# Patient Record
Sex: MALE | Race: OTHER | HISPANIC OR LATINO | Employment: UNEMPLOYED | URBAN - METROPOLITAN AREA
[De-identification: names, ages, dates, MRNs, and addresses within clinical notes are randomized per-mention and may not be internally consistent; named-entity substitution may affect disease eponyms.]

---

## 2017-05-12 ENCOUNTER — HOSPITAL ENCOUNTER (EMERGENCY)
Facility: HOSPITAL | Age: 32
Discharge: HOME | End: 2017-05-12
Payer: COMMERCIAL

## 2017-05-12 VITALS
SYSTOLIC BLOOD PRESSURE: 144 MMHG | RESPIRATION RATE: 22 BRPM | OXYGEN SATURATION: 99 % | TEMPERATURE: 97.8 F | DIASTOLIC BLOOD PRESSURE: 80 MMHG | WEIGHT: 190.26 LBS | HEART RATE: 80 BPM
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
## 2017-05-12 DIAGNOSIS — S41.159A DOG BITE OF ARM: Primary | ICD-10-CM

## 2017-05-12 PROCEDURE — 90471 IMMUNIZATION ADMIN: CPT

## 2017-05-12 PROCEDURE — 99283 EMERGENCY DEPT VISIT LOW MDM: CPT

## 2017-05-12 PROCEDURE — 90715 TDAP VACCINE 7 YRS/> IM: CPT | Performed by: PHYSICIAN ASSISTANT

## 2017-05-12 RX ORDER — BACITRACIN, NEOMYCIN, POLYMYXIN B 400; 3.5; 5 [USP'U]/G; MG/G; [USP'U]/G
OINTMENT TOPICAL
Status: DISCONTINUED
Start: 2017-05-12 | End: 2017-05-12 | Stop reason: WASHOUT

## 2017-05-12 RX ORDER — AMOXICILLIN AND CLAVULANATE POTASSIUM 875; 125 MG/1; MG/1
1 TABLET, FILM COATED ORAL 2 TIMES DAILY
Qty: 20 TABLET | Refills: 0 | Status: SHIPPED | OUTPATIENT
Start: 2017-05-12 | End: 2017-05-22

## 2017-05-12 RX ORDER — AMOXICILLIN AND CLAVULANATE POTASSIUM 875; 125 MG/1; MG/1
1 TABLET, FILM COATED ORAL ONCE
Status: COMPLETED | OUTPATIENT
Start: 2017-05-12 | End: 2017-05-12

## 2017-05-12 RX ORDER — IBUPROFEN 600 MG/1
600 TABLET ORAL EVERY 6 HOURS PRN
Qty: 30 TABLET | Refills: 0 | Status: SHIPPED | OUTPATIENT
Start: 2017-05-12 | End: 2020-12-20 | Stop reason: ALTCHOICE

## 2017-05-12 RX ORDER — BACITRACIN ZINC, NEOMYCIN SULFATE, AND POLYMYXIN B SULFATE 400; 3.5; 5 [IU]/G; MG/G; [IU]/G
1 OINTMENT TOPICAL ONCE
Status: DISCONTINUED | OUTPATIENT
Start: 2017-05-12 | End: 2017-05-12 | Stop reason: CLARIF

## 2017-05-12 RX ADMIN — TETANUS TOXOID, REDUCED DIPHTHERIA TOXOID AND ACELLULAR PERTUSSIS VACCINE, ADSORBED 0.5 ML: 5; 2.5; 8; 8; 2.5 SUSPENSION INTRAMUSCULAR at 10:43

## 2017-05-12 RX ADMIN — AMOXICILLIN AND CLAVULANATE POTASSIUM 1 TABLET: 875; 125 TABLET, FILM COATED ORAL at 10:43

## 2017-10-26 ENCOUNTER — GENERIC CONVERSION - ENCOUNTER (OUTPATIENT)
Dept: OTHER | Facility: OTHER | Age: 32
End: 2017-10-26

## 2018-01-09 NOTE — MISCELLANEOUS
Provider Comments  Provider Comments:   Patient was a no show to today's appointment at 1300        Signatures   Electronically signed by : JESSICA Brooks; Mar  2 2016  9:12PM EST                       (Author)    Electronically signed by : TATIANA Salmon ; Mar  3 2016 11:05AM EST

## 2018-01-11 NOTE — MISCELLANEOUS
Provider Comments  Provider Comments:   pt didn't show to his appointment        Signatures   Electronically signed by : ERICK Mabry; Jun 9 2016  4:42PM EST                       (Author)    Electronically signed by : TATIANA Cruz ; Jun 9 2016  5:27PM EST

## 2018-01-11 NOTE — MISCELLANEOUS
Dear [facility]:       This letter is to inform you that you are being dismissed from our practice due to excessive No-shows for scheduled medical visits  A no-show is defined as any patient who fails to arrive for a scheduled appointment without canceling the appointment  prior to the scheduled time  A patient who has more than three no-shows may be dismissed from a [facility] practice  You have had 3 no-shows in the past   months  In the event of a suspected serious emergency medical condition, please go to the closest hospital Emergency Department  There are many competent physicians in the surrounding area  Sources you may want to use in finding a new physician  may include contacting your insurance company, calling a local hospital, or reviewing the Yellow Pages of your local telephone book  Please have your new physicians office send us a medical records release form to transfer your medical  record  Respectfully,    ________________________________     Practice Administrator                    LETRA DE NOTIFICACIÓN DE NO SHOW  (Para imprimir en papel con membrete práctico)              Estimado  ABIMELEC:    Esta carta es para informarle que está siendo despedido de [facility] práctica debido a exceso de no-muestra o no-shows para las visitas [employer]  Un no-show se  define elizabeth cualquier paciente que no llega a yobany lv programada sin cancelar la lv antes de la hora programada  Un paciente que tiene más de vanesa, (3) no-shows puede ser despedido de la práctica [facility]  Usted  ha tenido 3 no-shows en el pasado meses  En el polly de yobany sospecha de yobany condición médica de emergencia grave, acuda al servicio de urgencias del hospital más cercano  Hay muchos médicos competentes en los alrededores   [place] que puede utilizar para encontrar un nuevo médico pueden incluir contactar a gar compañía de Natasha Lacey a un hospital local o revisar las [place] rin [place] telefónica  local  Damaris que la oficina de gar nuevo médico le envíe yobany autorización de registro médico para transferir gar expediente médico     Respetuosamente,       _____________________                                 Kurt Don

## 2020-12-20 ENCOUNTER — HOSPITAL ENCOUNTER (EMERGENCY)
Facility: HOSPITAL | Age: 35
Discharge: HOME | End: 2020-12-20
Attending: EMERGENCY MEDICINE | Admitting: EMERGENCY MEDICINE
Payer: COMMERCIAL

## 2020-12-20 VITALS
RESPIRATION RATE: 18 BRPM | SYSTOLIC BLOOD PRESSURE: 166 MMHG | TEMPERATURE: 98.5 F | DIASTOLIC BLOOD PRESSURE: 85 MMHG | OXYGEN SATURATION: 98 % | WEIGHT: 199.08 LBS | HEART RATE: 114 BPM

## 2020-12-20 DIAGNOSIS — T69.9XXA COLD, EXPOSURE TO, INITIAL ENCOUNTER: Primary | ICD-10-CM

## 2020-12-20 LAB
ANION GAP SERPL CALCULATED.3IONS-SCNC: 10 MMOL/L (ref 5–14)
BUN SERPL-MCNC: 12 MG/DL (ref 5–25)
CALCIUM SERPL-MCNC: 9.3 MG/DL (ref 8.4–10.2)
CHLORIDE SERPL-SCNC: 100 MMOL/L (ref 97–108)
CO2 SERPL-SCNC: 26 MMOL/L (ref 22–30)
CREAT SERPL-MCNC: 0.58 MG/DL (ref 0.7–1.5)
GFR SERPL CREATININE-BSD FRML MDRD: 133 ML/MIN/1.73SQ M
GLUCOSE SERPL-MCNC: 137 MG/DL (ref 70–99)
POTASSIUM SERPL-SCNC: 3.9 MMOL/L (ref 3.6–5)
SODIUM SERPL-SCNC: 136 MMOL/L (ref 137–147)

## 2020-12-20 PROCEDURE — 36415 COLL VENOUS BLD VENIPUNCTURE: CPT | Performed by: EMERGENCY MEDICINE

## 2020-12-20 PROCEDURE — 99283 EMERGENCY DEPT VISIT LOW MDM: CPT

## 2020-12-20 PROCEDURE — 80048 BASIC METABOLIC PNL TOTAL CA: CPT | Performed by: EMERGENCY MEDICINE

## 2020-12-20 PROCEDURE — 99284 EMERGENCY DEPT VISIT MOD MDM: CPT | Performed by: EMERGENCY MEDICINE

## 2020-12-20 RX ORDER — TRAMADOL HYDROCHLORIDE 50 MG/1
50 TABLET ORAL EVERY 6 HOURS PRN
Qty: 16 TABLET | Refills: 0 | Status: SHIPPED | OUTPATIENT
Start: 2020-12-20 | End: 2020-12-30

## 2020-12-20 RX ORDER — TRAMADOL HYDROCHLORIDE 50 MG/1
50 TABLET ORAL ONCE
Status: COMPLETED | OUTPATIENT
Start: 2020-12-20 | End: 2020-12-20

## 2020-12-20 RX ADMIN — TRAMADOL HYDROCHLORIDE 50 MG: 50 TABLET, FILM COATED ORAL at 19:40

## 2022-04-25 ENCOUNTER — HOSPITAL ENCOUNTER (EMERGENCY)
Facility: HOSPITAL | Age: 37
Discharge: HOME | End: 2022-04-25
Attending: EMERGENCY MEDICINE
Payer: COMMERCIAL

## 2022-04-25 VITALS
DIASTOLIC BLOOD PRESSURE: 121 MMHG | OXYGEN SATURATION: 96 % | SYSTOLIC BLOOD PRESSURE: 150 MMHG | RESPIRATION RATE: 14 BRPM | HEART RATE: 107 BPM

## 2022-04-25 DIAGNOSIS — Z13.9 ENCOUNTER FOR MEDICAL SCREENING EXAMINATION: Primary | ICD-10-CM

## 2022-04-25 PROCEDURE — 99282 EMERGENCY DEPT VISIT SF MDM: CPT | Performed by: EMERGENCY MEDICINE

## 2022-04-25 PROCEDURE — 99284 EMERGENCY DEPT VISIT MOD MDM: CPT

## 2022-04-26 NOTE — ED PROVIDER NOTES
History  Chief Complaint   Patient presents with    Personal Problem     Patient found unresponsive  Claims he was sleeping in his car, denies any drug use and claims people thought he was overdosing       History provided by:  Patient and EMS personnel   used: No    Altered Mental Status  Presenting symptoms: unresponsiveness    Severity:  Moderate  Most recent episode: Today  Episode history:  Single  Timing:  Unable to specify  Progression:  Resolved  Chronicity:  New  Context: not alcohol use, not dementia, not drug use, not head injury, not homeless, taking medications as prescribed, not nursing home resident, not recent change in medication, not recent illness and not recent infection    Associated symptoms: no abdominal pain, normal movement, no agitation, no bladder incontinence, no decreased appetite, no depression, no difficulty breathing, no eye deviation, no fever, no hallucinations, no headaches, no light-headedness, no nausea, no palpitations, no rash, no seizures, no slurred speech, no suicidal behavior, no visual change, no vomiting and no weakness    Associated symptoms comment:  Patient denies any symptoms at this time  Patient denies any trauma history  Patient denies any drug or alcohol ingestion today  None       Past Medical History:   Diagnosis Date    Meningitis        History reviewed  No pertinent surgical history  History reviewed  No pertinent family history  I have reviewed and agree with the history as documented      E-Cigarette/Vaping    E-Cigarette Use Never User      E-Cigarette/Vaping Substances    Nicotine No     THC No     CBD No     Flavoring No     Other No     Unknown No      Social History     Tobacco Use    Smoking status: Current Every Day Smoker     Packs/day: 1 00    Smokeless tobacco: Never Used   Vaping Use    Vaping Use: Never used   Substance Use Topics    Alcohol use: No    Drug use: No       Review of Systems   Unable to perform ROS: Other (Patient uncooperative declining any further questioning )   Constitutional: Negative for decreased appetite and fever  Cardiovascular: Negative for palpitations  Gastrointestinal: Negative for abdominal pain, nausea and vomiting  Genitourinary: Negative for bladder incontinence  Skin: Negative for rash  Neurological: Negative for seizures, weakness, light-headedness and headaches  Psychiatric/Behavioral: Negative for agitation and hallucinations  Physical Exam  Physical Exam  Vitals and nursing note reviewed  Constitutional:       Appearance: Normal appearance  HENT:      Head: Atraumatic  Mouth/Throat:      Mouth: Mucous membranes are moist       Pharynx: Oropharynx is clear  Eyes:      Extraocular Movements: Extraocular movements intact  Pupils: Pupils are equal, round, and reactive to light  Cardiovascular:      Rate and Rhythm: Regular rhythm  Tachycardia present  Pulmonary:      Effort: Pulmonary effort is normal       Breath sounds: Normal breath sounds  Abdominal:      General: Bowel sounds are normal       Palpations: Abdomen is soft  Tenderness: There is no abdominal tenderness  Musculoskeletal:         General: Normal range of motion  Cervical back: Normal range of motion and neck supple  Neurological:      General: No focal deficit present  Mental Status: He is alert and oriented to person, place, and time  Mental status is at baseline  Psychiatric:         Thought Content:  Thought content normal          Judgment: Judgment normal          Vital Signs  ED Triage Vitals [04/25/22 2035]   Temp Pulse Respirations Blood Pressure SpO2   -- (!) 107 14 (!) 150/121 96 %      Temp src Heart Rate Source Patient Position - Orthostatic VS BP Location FiO2 (%)   -- Monitor -- Right arm --      Pain Score       --           Vitals:    04/25/22 2035   BP: (!) 150/121   Pulse: (!) 107         Visual Acuity      ED Medications  Medications - No data to display    Diagnostic Studies  Results Reviewed     None                 No orders to display              Procedures  Procedures         ED Course                               SBIRT 20yo+      Most Recent Value   SBIRT (24 yo +)    In order to provide better care to our patients, we are screening all of our patients for alcohol and drug use  Would it be okay to ask you these screening questions? No Filed at: 04/25/2022 2036                    [facility]  Number of Diagnoses or Management Options  Encounter for medical screening examination: new and does not require workup  Diagnosis management comments: Patient states that he was found sleeping in his car denies drug or alcohol use at this time  Patient declines any further evaluation clinic laboratory testing or radiographic evaluation  Patient request discharge state he may go home  Patient does not exhibit any obvious toxidrome is at this time  He does appear somewhat guarded answering my questions however offers no medical complaints appears to be clinically sober  Patient will be discharged home  Patient counseled to follow-up with primary care provider as needed and return should he reconsider his decision for evaluation  Patient was provided transportation via left home    Patient ambulated from the emergency department with a steady gait      Risk of Complications, Morbidity, and/or Mortality  Presenting problems: moderate  Diagnostic procedures: low  Management options: low    Patient Progress  Patient progress: improved      Disposition  Final diagnoses:   Encounter for medical screening examination     Time reflects when diagnosis was documented in both MDM as applicable and the Disposition within this note     Time User Action Codes Description Comment    4/25/2022  8:42 PM Brenton Al Add [Z13 [id number]] Encounter for medical screening examination       ED Disposition     ED Disposition Condition Date/Time Comment    Discharge Stable Mon Apr 25, 2022  8:42    [address] discharge to home/self care  Follow-up Information     Follow up With Specialties Details Why Contact Info Additional Information    Mark Parekh MD Family Medicine  As needed [address]  [address] [facility]  Randolph Nielsen   49  [facility] 43        [facility] Emergency Department Emergency Medicine  If symptoms worsen [address]  [address] [facility] 64 [facility] Emergency Department, [address], [address]   [phone]          There are no discharge medications for this patient  No discharge procedures on file      PDMP Review     None          ED Provider  Electronically Signed by           Gisella Dumont MD  04/26/22 8884

## 2023-01-19 ENCOUNTER — OFFICE VISIT (OUTPATIENT)
Dept: FAMILY MEDICINE CLINIC | Facility: CLINIC | Age: 38
End: 2023-01-19

## 2023-01-19 VITALS
DIASTOLIC BLOOD PRESSURE: 62 MMHG | HEART RATE: 93 BPM | HEIGHT: 68 IN | WEIGHT: 236 LBS | TEMPERATURE: 96.6 F | SYSTOLIC BLOOD PRESSURE: 120 MMHG | OXYGEN SATURATION: 97 % | RESPIRATION RATE: 18 BRPM | BODY MASS INDEX: 35.77 KG/M2

## 2023-01-19 DIAGNOSIS — Z13.0 SCREENING, ANEMIA, DEFICIENCY, IRON: ICD-10-CM

## 2023-01-19 DIAGNOSIS — Z13.1 SCREENING FOR DIABETES MELLITUS: ICD-10-CM

## 2023-01-19 DIAGNOSIS — Z13.29 SCREENING FOR THYROID DISORDER: ICD-10-CM

## 2023-01-19 DIAGNOSIS — L02.91 ABSCESS: Primary | ICD-10-CM

## 2023-01-19 DIAGNOSIS — F32.89 OTHER DEPRESSION: ICD-10-CM

## 2023-01-19 DIAGNOSIS — K21.9 GASTROESOPHAGEAL REFLUX DISEASE WITHOUT ESOPHAGITIS: ICD-10-CM

## 2023-01-19 DIAGNOSIS — Z11.59 ENCOUNTER FOR HEPATITIS C SCREENING TEST FOR LOW RISK PATIENT: ICD-10-CM

## 2023-01-19 DIAGNOSIS — Z11.4 ENCOUNTER FOR SCREENING FOR HUMAN IMMUNODEFICIENCY VIRUS (HIV): ICD-10-CM

## 2023-01-19 DIAGNOSIS — Z13.220 ENCOUNTER FOR SCREENING FOR LIPID DISORDER: ICD-10-CM

## 2023-01-19 DIAGNOSIS — Z11.3 SCREEN FOR STD (SEXUALLY TRANSMITTED DISEASE): ICD-10-CM

## 2023-01-19 PROBLEM — F32.A DEPRESSION: Status: ACTIVE | Noted: 2018-05-22

## 2023-01-19 PROBLEM — F41.9 ANXIETY: Status: ACTIVE | Noted: 2018-05-22

## 2023-01-19 RX ORDER — SULFAMETHOXAZOLE AND TRIMETHOPRIM 800; 160 MG/1; MG/1
1 TABLET ORAL EVERY 12 HOURS SCHEDULED
Qty: 20 TABLET | Refills: 0 | Status: SHIPPED | OUTPATIENT
Start: 2023-01-19 | End: 2023-01-29

## 2023-01-19 RX ORDER — BUPRENORPHINE HYDROCHLORIDE AND NALOXONE HYDROCHLORIDE DIHYDRATE 8; 2 MG/1; MG/1
TABLET SUBLINGUAL
COMMUNITY
Start: 2023-01-03

## 2023-01-19 RX ORDER — ZOLPIDEM TARTRATE 10 MG/1
10 TABLET ORAL
COMMUNITY

## 2023-01-19 RX ORDER — PANTOPRAZOLE SODIUM 20 MG/1
20 TABLET, DELAYED RELEASE ORAL
Qty: 30 TABLET | Refills: 2 | Status: SHIPPED | OUTPATIENT
Start: 2023-01-19

## 2023-01-19 RX ORDER — BUPROPION HYDROCHLORIDE 75 MG/1
75 TABLET ORAL DAILY
COMMUNITY
Start: 2022-11-25

## 2023-01-19 NOTE — ASSESSMENT & PLAN NOTE
Patient likely has GERD  Due to the length and severity of symptoms, should have an EGD  Will discuss at follow up  Start Protonix daily  Continue antacids as needed  Avoid trigger foods  Avoid greasy, fried, and acidic foods  Avoid pork and red meats  Avoid caffeine and alcohol  Avoid chocolate  Recommend smaller, more frequent meals  Avoid over eating  Avoid laying flat within 2-3 hours of eating  If no improvement at follow up in one month, will recommend H  Pylori testing

## 2023-01-19 NOTE — ASSESSMENT & PLAN NOTE
Likely hidradenitis suppurativa  Will refer to dermatology and management  Start Bactrim  Finish course completely  Recommend antibiotic  Warm compresses  Discussed skin care  ER precautions given

## 2023-01-19 NOTE — PROGRESS NOTES
Name: Marquita Molina      : 1985      MRN: [id number]  Encounter Provider: Neeraj Gates PA-C  Encounter Date: 2023   Encounter department: [address] [place] [address]     1  Abscess  Assessment & Plan:  Likely hidradenitis suppurativa  Will refer to dermatology and management  Start Bactrim  Finish course completely  Recommend antibiotic  Warm compresses  Discussed skin care  ER precautions given  Orders:  -     Ambulatory Referral to Dermatology; Future  -     sulfamethoxazole-trimethoprim (BACTRIM DS) 800-160 mg per tablet; Take 1 tablet by mouth every 12 (twelve) hours for 10 days    2  Gastroesophageal reflux disease without esophagitis  Assessment & Plan:  Patient likely has GERD  Due to the length and severity of symptoms, should have an EGD  Will discuss at follow up  Start Protonix daily  Continue antacids as needed  Avoid trigger foods  Avoid greasy, fried, and acidic foods  Avoid pork and red meats  Avoid caffeine and alcohol  Avoid chocolate  Recommend smaller, more frequent meals  Avoid over eating  Avoid laying flat within 2-3 hours of eating  If no improvement at follow up in one month, will recommend H  Pylori testing  Orders:  -     pantoprazole (PROTONIX) 20 mg tablet; Take 1 tablet (20 mg total) by mouth daily before breakfast    3  Other depression  Assessment & Plan:  Followed by [facility] for anxiety and depression  States he is doing well  4  Screen for STD (sexually transmitted disease)  Assessment & Plan:  Patient requested STI testing  Asymptomatic  Orders:  -     RPR; Future  -     Chlamydia/N  gonorrhoeae RNA, TMA; Future    5  Screening, anemia, deficiency, iron  -     CBC and differential; Future    6  Screening for thyroid disorder  -     TSH, 3rd generation with Free T4 reflex; Future    7  Encounter for screening for lipid disorder  -     Lipid panel; Future    8   Screening for diabetes mellitus  -     Comprehensive metabolic panel; Future  -     HEMOGLOBIN A1C W/ EAG ESTIMATION; Future    9  Encounter for hepatitis C screening test for low risk patient  -     Hepatitis C antibody; Future    10  Encounter for screening for human immunodeficiency virus (HIV)  -     HIV 1/2 AG/AB w Reflex [facility] for 2 yr old and above; Future         Subjective      Patient is a 40year old male presenting to [facility]  States he has had a mass in his left axilla for several years  States it is painful, red, and swollen  This comes and goes  Admits to purulent discharge when this occurs  He has not been seen for this yet  Denies fever or chills  He is also complaining of heart burn and reflux daily  States it is intermittent, but occurs multiple times a day  Sometimes lasts hours at a time  This has been occurring for many years, he is unsure when  Denies nausea or vomiting  Denies constipation or diarrhea  Denies hematochezia and melena  He takes OTC antacids like vikash seltzer which helps  Review of Systems   Constitutional: Negative for appetite change, chills, diaphoresis, fatigue, fever and unexpected weight change  HENT: Negative for congestion, dental problem, hearing loss, sore throat, tinnitus and trouble swallowing  Eyes: Negative for visual disturbance  Respiratory: Negative for cough, chest tightness, shortness of breath and wheezing  Cardiovascular: Negative for chest pain, palpitations and leg swelling  Gastrointestinal: Positive for abdominal pain  Negative for abdominal distention, blood in stool, constipation, diarrhea, nausea and vomiting  Endocrine: Negative for cold intolerance, heat intolerance, polydipsia, polyphagia and polyuria  Musculoskeletal: Negative for arthralgias and myalgias  Skin: Negative for rash  Neurological: Negative for dizziness, tremors, weakness, light-headedness, numbness and headaches  Hematological: Negative for adenopathy         Current Outpatient Medications on File Prior to Visit   Medication Sig   • zolpidem (Ambien) 10 mg tablet Take 10 mg by mouth daily at bedtime as needed for sleep   • buprenorphine-naloxone (SUBOXONE) 8-2 mg per SL tablet TWO TABLETS SUBLINGUALLY DAILY   • buPROPion (WELLBUTRIN) 75 mg tablet Take 75 mg by mouth daily       Objective     /62 (BP Location: Right arm, Patient Position: Sitting, Cuff Size: Large)   Pulse 93   Temp (!) 96 6 °F (35 9 °C) (Temporal)   Resp 18   Ht 5' 8" (1 727 m)   Wt 107 kg (236 lb)   SpO2 97%   BMI 35 88 kg/m²     Physical Exam  Vitals and nursing note reviewed  Constitutional:       General: He is awake  He is not in acute distress  Appearance: Normal appearance  He is well-developed and well-groomed  He is obese  He is not ill-appearing  HENT:      Head: Normocephalic and atraumatic  Eyes:      General: No scleral icterus  Conjunctiva/sclera: Conjunctivae normal    Cardiovascular:      Rate and Rhythm: Normal rate and regular rhythm  Pulses: Normal pulses  Heart sounds: Normal heart sounds  No murmur heard  Pulmonary:      Effort: Pulmonary effort is normal  No respiratory distress  Breath sounds: Normal breath sounds and air entry  No decreased air movement  No decreased breath sounds, wheezing, rhonchi or rales  Abdominal:      General: Abdomen is flat  Bowel sounds are normal  There is no distension  Palpations: Abdomen is soft  There is no mass  Tenderness: There is no abdominal tenderness  There is no right CVA tenderness, left CVA tenderness, guarding or rebound  Hernia: No hernia is present  Musculoskeletal:         General: Normal range of motion  Cervical back: Neck supple  Lymphadenopathy:      Cervical: No cervical adenopathy  Skin:     General: Skin is warm  Coloration: Skin is not jaundiced  Findings: No rash  Neurological:      General: No focal deficit present  Mental Status: He is alert and oriented to person, place, and time  Mental status is at baseline  Motor: Motor function is intact  Coordination: Coordination is intact  Gait: Gait is intact  Psychiatric:         Attention and Perception: Attention normal          Mood and Affect: Mood and affect normal          Speech: Speech normal          Behavior: Behavior normal  Behavior is cooperative  Thought Content:  Thought content normal          Cognition and Memory: Cognition normal        Con DARNELL Aguirre

## 2023-02-01 ENCOUNTER — APPOINTMENT (OUTPATIENT)
Dept: LAB | Facility: CLINIC | Age: 38
End: 2023-02-01

## 2023-02-01 DIAGNOSIS — Z13.29 SCREENING FOR THYROID DISORDER: ICD-10-CM

## 2023-02-01 DIAGNOSIS — Z11.3 SCREEN FOR STD (SEXUALLY TRANSMITTED DISEASE): ICD-10-CM

## 2023-02-01 DIAGNOSIS — Z13.1 SCREENING FOR DIABETES MELLITUS: ICD-10-CM

## 2023-02-01 DIAGNOSIS — Z11.59 ENCOUNTER FOR HEPATITIS C SCREENING TEST FOR LOW RISK PATIENT: ICD-10-CM

## 2023-02-01 DIAGNOSIS — Z13.0 SCREENING, ANEMIA, DEFICIENCY, IRON: ICD-10-CM

## 2023-02-01 DIAGNOSIS — Z13.220 ENCOUNTER FOR SCREENING FOR LIPID DISORDER: ICD-10-CM

## 2023-02-01 DIAGNOSIS — Z11.4 ENCOUNTER FOR SCREENING FOR HUMAN IMMUNODEFICIENCY VIRUS (HIV): ICD-10-CM

## 2023-02-01 LAB
ALBUMIN SERPL BCP-MCNC: 4.2 G/DL (ref 3.5–5)
ALP SERPL-CCNC: 92 U/L (ref 46–116)
ALT SERPL W P-5'-P-CCNC: 41 U/L (ref 12–78)
ANION GAP SERPL CALCULATED.3IONS-SCNC: 7 MMOL/L (ref 4–13)
AST SERPL W P-5'-P-CCNC: 21 U/L (ref 5–45)
BASOPHILS # BLD AUTO: 0.07 THOUSANDS/ÂΜL (ref 0–0.1)
BASOPHILS NFR BLD AUTO: 1 % (ref 0–1)
BILIRUB SERPL-MCNC: 0.54 MG/DL (ref 0.2–1)
BUN SERPL-MCNC: 9 MG/DL (ref 5–25)
CALCIUM SERPL-MCNC: 9.8 MG/DL (ref 8.3–10.1)
CHLORIDE SERPL-SCNC: 107 MMOL/L (ref 96–108)
CHOLEST SERPL-MCNC: 252 MG/DL
CO2 SERPL-SCNC: 24 MMOL/L (ref 21–32)
CREAT SERPL-MCNC: 0.84 MG/DL (ref 0.6–1.3)
EOSINOPHIL # BLD AUTO: 0.24 THOUSAND/ÂΜL (ref 0–0.61)
EOSINOPHIL NFR BLD AUTO: 2 % (ref 0–6)
ERYTHROCYTE [DISTWIDTH] IN BLOOD BY AUTOMATED COUNT: 12.8 % (ref 11.6–15.1)
EST. AVERAGE GLUCOSE BLD GHB EST-MCNC: 114 MG/DL
GFR SERPL CREATININE-BSD FRML MDRD: 112 ML/MIN/1.73SQ M
GLUCOSE P FAST SERPL-MCNC: 116 MG/DL (ref 65–99)
HBA1C MFR BLD: 5.6 %
HCT VFR BLD AUTO: 44.3 % (ref 36.5–49.3)
HCV AB SER QL: NORMAL
HDLC SERPL-MCNC: 32 MG/DL
HGB BLD-MCNC: 14.7 G/DL (ref 12–17)
IMM GRANULOCYTES # BLD AUTO: 0.06 THOUSAND/UL (ref 0–0.2)
IMM GRANULOCYTES NFR BLD AUTO: 1 % (ref 0–2)
LDLC SERPL CALC-MCNC: 176 MG/DL (ref 0–100)
LYMPHOCYTES # BLD AUTO: 3.8 THOUSANDS/ÂΜL (ref 0.6–4.47)
LYMPHOCYTES NFR BLD AUTO: 32 % (ref 14–44)
MCH RBC QN AUTO: 28.6 PG (ref 26.8–34.3)
MCHC RBC AUTO-ENTMCNC: 33.2 G/DL (ref 31.4–37.4)
MCV RBC AUTO: 86 FL (ref 82–98)
MONOCYTES # BLD AUTO: 0.96 THOUSAND/ÂΜL (ref 0.17–1.22)
MONOCYTES NFR BLD AUTO: 8 % (ref 4–12)
NEUTROPHILS # BLD AUTO: 6.69 THOUSANDS/ÂΜL (ref 1.85–7.62)
NEUTS SEG NFR BLD AUTO: 56 % (ref 43–75)
NONHDLC SERPL-MCNC: 220 MG/DL
NRBC BLD AUTO-RTO: 0 /100 WBCS
PLATELET # BLD AUTO: 417 THOUSANDS/UL (ref 149–390)
PMV BLD AUTO: 10.1 FL (ref 8.9–12.7)
POTASSIUM SERPL-SCNC: 3.9 MMOL/L (ref 3.5–5.3)
PROT SERPL-MCNC: 8 G/DL (ref 6.4–8.4)
RBC # BLD AUTO: 5.14 MILLION/UL (ref 3.88–5.62)
SODIUM SERPL-SCNC: 138 MMOL/L (ref 135–147)
TRIGL SERPL-MCNC: 222 MG/DL
TSH SERPL DL<=0.05 MIU/L-ACNC: 2.01 UIU/ML (ref 0.45–4.5)
WBC # BLD AUTO: 11.82 THOUSAND/UL (ref 4.31–10.16)

## 2023-02-02 LAB
HIV 1+2 AB+HIV1 P24 AG SERPL QL IA: NORMAL
HIV 2 AB SERPL QL IA: NORMAL
HIV1 AB SERPL QL IA: NORMAL
HIV1 P24 AG SERPL QL IA: NORMAL
RPR SER QL: NORMAL

## 2023-02-08 DIAGNOSIS — E78.2 MIXED HYPERLIPIDEMIA: Primary | ICD-10-CM

## 2023-02-08 RX ORDER — ROSUVASTATIN CALCIUM 10 MG/1
10 TABLET, COATED ORAL DAILY
Qty: 90 TABLET | Refills: 2 | Status: SHIPPED | OUTPATIENT
Start: 2023-02-08 | End: 2023-02-23 | Stop reason: SDUPTHER

## 2023-02-23 ENCOUNTER — OFFICE VISIT (OUTPATIENT)
Dept: FAMILY MEDICINE CLINIC | Facility: CLINIC | Age: 38
End: 2023-02-23

## 2023-02-23 VITALS
WEIGHT: 230 LBS | SYSTOLIC BLOOD PRESSURE: 120 MMHG | DIASTOLIC BLOOD PRESSURE: 66 MMHG | BODY MASS INDEX: 34.86 KG/M2 | HEART RATE: 77 BPM | HEIGHT: 68 IN | OXYGEN SATURATION: 97 % | TEMPERATURE: 98.8 F | RESPIRATION RATE: 18 BRPM

## 2023-02-23 DIAGNOSIS — E78.2 MIXED HYPERLIPIDEMIA: Primary | ICD-10-CM

## 2023-02-23 DIAGNOSIS — L02.91 ABSCESS: ICD-10-CM

## 2023-02-23 DIAGNOSIS — K21.9 GASTROESOPHAGEAL REFLUX DISEASE WITHOUT ESOPHAGITIS: ICD-10-CM

## 2023-02-23 PROBLEM — Z11.3 SCREEN FOR STD (SEXUALLY TRANSMITTED DISEASE): Status: RESOLVED | Noted: 2023-01-19 | Resolved: 2023-02-23

## 2023-02-23 RX ORDER — SULFAMETHOXAZOLE AND TRIMETHOPRIM 800; 160 MG/1; MG/1
1 TABLET ORAL EVERY 12 HOURS SCHEDULED
Qty: 20 TABLET | Refills: 0 | Status: SHIPPED | OUTPATIENT
Start: 2023-02-23 | End: 2023-03-05

## 2023-02-23 RX ORDER — PALIPERIDONE PALMITATE 117 MG/.75ML
INJECTION INTRAMUSCULAR
COMMUNITY
Start: 2022-11-21

## 2023-02-23 RX ORDER — ROSUVASTATIN CALCIUM 10 MG/1
10 TABLET, COATED ORAL
Qty: 90 TABLET | Refills: 2 | Status: SHIPPED | OUTPATIENT
Start: 2023-02-23

## 2023-02-23 RX ORDER — CLONAZEPAM 1 MG/1
TABLET ORAL
COMMUNITY
Start: 2023-02-02

## 2023-02-23 RX ORDER — PANTOPRAZOLE SODIUM 20 MG/1
20 TABLET, DELAYED RELEASE ORAL
Qty: 30 TABLET | Refills: 2 | Status: SHIPPED | OUTPATIENT
Start: 2023-02-23

## 2023-02-23 NOTE — ASSESSMENT & PLAN NOTE
Patient likely has GERD  Due to length and severity of symptoms, should have an EGD  Will discuss at follow up and possibly discuss doing an H pylori test  Start Protonix 20 mg daily  Continue antacids as needed  Avoid trigger foods  Avoid acidic, fried, greasy, and fatty foods  Avoid beef and pork  Avoid caffeine and alcohol  Avoid chocolate  Smoking may contribute as well  Recommend smaller more frequent meals  Avoid overeating  Avoid laying flat within 2-3 hours of eating  Follow up in 3 months, sooner if needed

## 2023-02-23 NOTE — ASSESSMENT & PLAN NOTE
Lab Results   Component Value Date    CHOLESTEROL 252 (H) 02/01/2023     Lab Results   Component Value Date    HDL 32 (L) 02/01/2023     Lab Results   Component Value Date    TRIG 222 (H) 02/01/2023     Lab Results   Component Value Date    NONHDLC 220 02/01/2023     Lab Results   Component Value Date    LDLCALC 176 (H) 02/01/2023     Reviewed recent labs  Patient did not start Crestor yet  Discussed low fat diet  Follow up in 3 months

## 2023-02-23 NOTE — ASSESSMENT & PLAN NOTE
Possible hidradenitis suppurativa  Upcoming appointment with dermatology 3/29  Start bactrim  Finish course completely  Recommend probiotic  Warm compresses  Discussed skin care  ER precautions given

## 2023-02-23 NOTE — PROGRESS NOTES
Name: Merton Lesches      : 1985      MRN: [id number]  Encounter Provider: Milady Lemus  Encounter Date: 2023   Encounter department: [facility]     1  Mixed hyperlipidemia  Assessment & Plan:  Lab Results   Component Value Date    CHOLESTEROL 252 (H) 2023     Lab Results   Component Value Date    HDL 32 (L) 2023     Lab Results   Component Value Date    TRIG 222 (H) 2023     Lab Results   Component Value Date    [place] 220 2023     Lab Results   Component Value Date    LDLCALC 176 (H) 2023     Reviewed recent labs  Patient did not start Crestor yet  Discussed low fat diet  Follow up in 3 months  Orders:  -     rosuvastatin (CRESTOR) 10 MG tablet; Take 1 tablet (10 mg total) by mouth daily at bedtime    2  Abscess  Assessment & Plan:  Possible hidradenitis suppurativa  Upcoming appointment with dermatology 3/29  Start bactrim  Finish course completely  Recommend probiotic  Warm compresses  Discussed skin care  ER precautions given  Orders:  -     sulfamethoxazole-trimethoprim (BACTRIM DS) 800-160 mg per tablet; Take 1 tablet by mouth every 12 (twelve) hours for 10 days    3  Gastroesophageal reflux disease without esophagitis  Assessment & Plan:  Patient likely has GERD  Due to length and severity of symptoms, should have an EGD  Will discuss at follow up and possibly discuss doing an H pylori test  Start Protonix 20 mg daily  Continue antacids as needed  Avoid trigger foods  Avoid acidic, fried, greasy, and fatty foods  Avoid beef and pork  Avoid caffeine and alcohol  Avoid chocolate  Smoking may contribute as well  Recommend smaller more frequent meals  Avoid overeating  Avoid laying flat within 2-3 hours of eating  Follow up in 3 months, sooner if needed  Orders:  -     pantoprazole (PROTONIX) 20 mg tablet; Take 1 tablet (20 mg total) by mouth daily before breakfast    4   BMI 34 0-34 9,adult    BMI Counseling: Body mass index is 34 97 kg/m²  The BMI is above normal  Nutrition recommendations include decreasing portion sizes, encouraging healthy choices of fruits and vegetables, decreasing fast food intake, consuming healthier snacks, limiting drinks that contain sugar, moderation in carbohydrate intake, increasing intake of lean protein, reducing intake of saturated and trans fat and reducing intake of cholesterol  Exercise recommendations include moderate physical activity 150 minutes/week, exercising 3-5 times per week and strength training exercises  No pharmacotherapy was ordered  Rationale for BMI follow-up plan is due to patient being overweight or obese  Subjective      Patient is a 40year old male presenting for follow up  States he has had a mass in his left axilla for several years  States it is painful, red, and swollen  This comes and goes  Admits to purulent discharge when this occurs  Denies fever or chills  He is also complaining of heart burn and reflux daily  States it is intermittent, but occurs multiple times a day  Sometimes lasts hours at a time  This has been occurring for many years, he is unsure when  Denies nausea or vomiting  Denies constipation or diarrhea  Denies hematochezia and melena  He takes OTC antacids like vikash seltzer which helps  At his last visit, I prescribed pantoprazole  He did not start this, states the pharmacy did not give it to him  Review of Systems   Constitutional: Negative for appetite change, chills, diaphoresis, fatigue, fever and unexpected weight change  HENT: Negative for congestion, dental problem, hearing loss, sore throat, tinnitus and trouble swallowing  Eyes: Negative for visual disturbance  Respiratory: Negative for cough, chest tightness, shortness of breath and wheezing  Cardiovascular: Negative for chest pain, palpitations and leg swelling  Gastrointestinal: Positive for abdominal pain   Negative for abdominal distention, blood in stool, constipation, diarrhea, nausea and vomiting  Endocrine: Negative for cold intolerance, heat intolerance, polydipsia, polyphagia and polyuria  Musculoskeletal: Negative for arthralgias and myalgias  Skin: Positive for rash  Neurological: Negative for dizziness, tremors, weakness, light-headedness, numbness and headaches  Hematological: Negative for adenopathy  Current Outpatient Medications on File Prior to Visit   Medication Sig   • buprenorphine-naloxone (SUBOXONE) 8-2 mg per SL tablet TWO TABLETS SUBLINGUALLY DAILY   • buPROPion (WELLBUTRIN) 75 mg tablet Take 75 mg by mouth daily   • clonazePAM (KlonoPIN) 1 mg tablet TAKE 1 TABLET BY ORAL ROUTE 2 TIMES PER DAY AS NEEDED FOR ANXIETY   • Invega Sustenna 117 MG/0 75ML IM injection INJECT 117 MG INTRAMUSCULARLY EVERY 4 WEEKS   • zolpidem (Ambien) 10 mg tablet Take 10 mg by mouth daily at bedtime as needed for sleep   • [DISCONTINUED] pantoprazole (PROTONIX) 20 mg tablet Take 1 tablet (20 mg total) by mouth daily before breakfast   • [DISCONTINUED] rosuvastatin (CRESTOR) 10 MG tablet Take 1 tablet (10 mg total) by mouth daily       Objective     /66 (BP Location: Left arm, Patient Position: Sitting, Cuff Size: Standard)   Pulse 77   Temp 98 8 °F (37 1 °C) (Temporal)   Resp 18   Ht 5' 8" (1 727 m)   Wt 104 kg (230 lb)   SpO2 97%   BMI 34 97 kg/m²     Physical Exam  Vitals and nursing note reviewed  Constitutional:       General: He is awake  He is not in acute distress  Appearance: Normal appearance  He is well-developed and well-groomed  He is obese  He is not ill-appearing  HENT:      Head: Normocephalic and atraumatic  Eyes:      General: No scleral icterus  Conjunctiva/sclera: Conjunctivae normal    Cardiovascular:      Rate and Rhythm: Normal rate and regular rhythm  Pulses: Normal pulses  Heart sounds: Normal heart sounds  No murmur heard    Pulmonary:      Effort: Pulmonary effort is normal  No respiratory distress  Breath sounds: Normal breath sounds and air entry  No decreased air movement  No decreased breath sounds, wheezing, rhonchi or rales  Abdominal:      General: Abdomen is flat  Bowel sounds are normal  There is no distension  Palpations: Abdomen is soft  There is no mass  Tenderness: There is no abdominal tenderness  There is no right CVA tenderness, left CVA tenderness, guarding or rebound  Hernia: No hernia is present  Musculoskeletal:         General: Normal range of motion  Cervical back: Neck supple  Lymphadenopathy:      Cervical: No cervical adenopathy  Skin:     General: Skin is warm  Coloration: Skin is not jaundiced  Findings: Rash present  Comments: Small area of induration and small opening in left axilla, mildly tender to the touch  No discharge  No fluctuance  No erythema  Neurological:      General: No focal deficit present  Mental Status: He is alert and oriented to person, place, and time  Mental status is at baseline  Motor: Motor function is intact  Coordination: Coordination is intact  Gait: Gait is intact  Psychiatric:         Attention and Perception: Attention normal          Mood and Affect: Mood and affect normal          Speech: Speech normal          Behavior: Behavior normal  Behavior is cooperative           Cognition and Memory: Cognition normal        Montrell Vick PA-C

## 2023-03-29 ENCOUNTER — CONSULT (OUTPATIENT)
Dept: MULTI SPECIALTY CLINIC | Facility: CLINIC | Age: 38
End: 2023-03-29

## 2023-03-29 VITALS — HEIGHT: 68 IN | WEIGHT: 226.6 LBS | TEMPERATURE: 98.6 F | BODY MASS INDEX: 34.34 KG/M2

## 2023-03-29 DIAGNOSIS — L73.2 HIDRADENITIS SUPPURATIVA: ICD-10-CM

## 2023-03-29 RX ORDER — HYDROXYZINE 50 MG/1
50 TABLET, FILM COATED ORAL
COMMUNITY
Start: 2023-03-06

## 2023-03-29 RX ORDER — SENNOSIDES 8.6 MG
TABLET ORAL
COMMUNITY
Start: 2023-03-18

## 2023-03-29 RX ORDER — NALOXONE HYDROCHLORIDE 4 MG/.1ML
SPRAY NASAL
COMMUNITY
Start: 2023-03-20

## 2023-03-29 RX ORDER — DOXYCYCLINE 100 MG/1
CAPSULE ORAL
Qty: 60 CAPSULE | Refills: 2 | Status: SHIPPED | OUTPATIENT
Start: 2023-03-29 | End: 2023-08-29

## 2023-03-29 RX ORDER — CLINDAMYCIN PHOSPHATE AND BENZOYL PEROXIDE 10; 50 MG/G; MG/G
GEL TOPICAL
Qty: 45 G | Refills: 5 | Status: SHIPPED | OUTPATIENT
Start: 2023-03-29

## 2023-03-29 NOTE — PATIENT INSTRUCTIONS
HIDRADENITIS SUPPURATIVA        Assessment and Plan:  Educated patient with hidradenitis suppurativa is an inflammatory skin disease that affects apocrine gland-bearing skin, classically in the axillae, groin, and under the breasts  Discussed that hidradenitis suppurative is characterized by recurrent boil-like nodules and abscesses than can be associated with discharge, difficult to heal open wounds (sinuses) and scarring  Discussed that HS often affects people between 21-42 yo and is more common in patients with history of obesity, insulin resistance/metabolic syndrome, other follicular disorders, cigarette smoking, IBD  Educated on treatment ladder of HS, including topical antibiotics/anti-inflammatories, metformin, oral antibiotics, isotretinoin, immunomodulating medications, procedures (excisions, de-angie, laser) including benefits and drawbacks  Educated on lifestyle factors that can help with improving HS including smoking cessation (not relevant for this patient), weight loss  Discussed that HS is frequently associated with depression and anxiety and that we need to work together to form a care team and support network for the patient  Based on a thorough discussion of this condition and the management approach to it (including a comprehensive discussion of the known risks, side effects and potential benefits of treatment), the patient (family) agrees to implement the following specific plan:    Recommend avoiding shaving in the armpits and groin  Discussed smoking cessation leads to improvement  Start Doxycycline 100 mg BID  Discussed that patient has no skin cancers, no history of MS or CHF   Will initiate topical treatment as below in the interim:  Start to use clindamycin-benzoyl peroxide gel  Apply twice a day to all affected areas on the skin; Bilateral flanks and armpits  Apply this in the morning and evening  Start to use over the counter Cetaphil gentle wash   (Below is a picture) Apply "daily when showering to all affected areas on the armpits, bilateral flanks, and anywhere that boils/cysts appear             A[place] (\"SKIN TAG\")      Assessment and Plan:  Based on a thorough discussion of this condition and the management approach to it (including a comprehensive discussion of the known risks, side effects and potential benefits of treatment), the patient (family) agrees to implement the following specific plan:  Reassured benign    "

## 2023-03-29 NOTE — PROGRESS NOTES
"[facility] Note     Patient Name: Mily Braswell  Encounter Date: 03/29/23     Have you been cared for by a [facility] Dermatologist in the last 3 years and, if so, which description applies to you? NO  I am considered a \"new\" patient and must complete all patient intake questions  I am MALE/not capable of bearing children  REVIEW OF SYSTEMS:  Have you recently had or currently have any of the following? · Recent fever or chills? No  · Any non-healing wound? No   PAST MEDICAL HISTORY:  Have you personally ever had or currently have any of the following? If \"YES,\" then please provide more detail  · Skin cancer (such as Melanoma, Basal Cell Carcinoma, Squamous Cell Carcinoma? No  · Tuberculosis, HIV/AIDS, Hepatitis B or C: No  · Systemic Immunosuppression such as Diabetes, Biologic or Immunotherapy, Chemotherapy, Organ Transplantation, Bone Marrow Transplantation No  · Radiation Treatment No   FAMILY HISTORY:  Any \"first degree relatives\" (parent, brother, sister, or child) with the following? • Skin Cancer, Pancreatic or Other Cancer? No   PATIENT EXPERIENCE:    • Do you want the Dermatologist to perform a COMPLETE skin exam today including a clinical examination under the \"bra and underwear\" areas? NO  • If necessary, do we have your permission to call and leave a detailed message on your Preferred Phone number that includes your specific medical information?   Yes      No Known Allergies   Current Outpatient Medications:   •  buprenorphine-naloxone (SUBOXONE) 8-2 mg per SL tablet, TWO TABLETS SUBLINGUALLY DAILY, Disp: , Rfl:   •  buPROPion (WELLBUTRIN) 75 mg tablet, Take 75 mg by mouth daily, Disp: , Rfl:   •  clonazePAM (KlonoPIN) 1 mg tablet, TAKE 1 TABLET BY ORAL ROUTE 2 TIMES PER DAY AS NEEDED FOR ANXIETY, Disp: , Rfl:   •  Invega Sustenna 117 MG/0 75ML IM injection, INJECT 117 MG INTRAMUSCULARLY EVERY 4 WEEKS, Disp: , Rfl:   •  pantoprazole (PROTONIX) 20 mg tablet, Take 1 " tablet (20 mg total) by mouth daily before breakfast, Disp: 30 tablet, Rfl: 2  •  rosuvastatin (CRESTOR) 10 MG tablet, Take 1 tablet (10 mg total) by mouth daily at bedtime, Disp: 90 tablet, Rfl: 2  •  zolpidem (Ambien) 10 mg tablet, Take 10 mg by mouth daily at bedtime as needed for sleep, Disp: , Rfl:           • Whom besides the patient is providing clinical information about today's encounter?   o NO ADDITIONAL HISTORIAN (patient alone provided history)       Physical Exam and Assessment/Plan by Diagnosis:    Patient has lump/cyst/ or bump from ingrown hair from shaving  Patient states he has had is for 5 years  Patient states that it comes and goes  It's only painfully when it is inflamed  HIDRADENITIS SUPPURATIVA    Physical Exam:  • Anatomic Location Affected:  Left axilla  • Morphological Description:  Erythematous nodules with sinus tracts and scarring  • AMADO STAGE: 1 (mild)  • Pertinent Positives:  • Pertinent Negatives: No acanthosis nigricans    Additional History of Present Condition:    - History of past smoking: Yes   - History of diabetes: No  - History of obesity: Yes   - History of IBD or IBD associated symptoms (frequent blood diarrhea, abdominal pain): No    Assessment and Plan:  • Educated patient with hidradenitis suppurativa is an inflammatory skin disease that affects apocrine gland-bearing skin, classically in the axillae, groin, and under the breasts     • Discussed that hidradenitis suppurative is characterized by recurrent boil-like nodules and abscesses than can be associated with discharge, difficult to heal open wounds (sinuses) and scarring  • Discussed that HS often affects people between 21-42 yo and is more common in patients with history of obesity, insulin resistance/metabolic syndrome, other follicular disorders, cigarette smoking, IBD  • Educated on treatment ladder of HS, including topical antibiotics/anti-inflammatories, metformin, oral antibiotics, isotretinoin, "immunomodulating medications, procedures (excisions, de-angie, laser) including benefits and drawbacks  • Educated on lifestyle factors that can help with improving HS including smoking cessation (not relevant for this patient), weight loss  • Discussed that HS is frequently associated with depression and anxiety and that we need to work together to form a care team and support network for the patient  • Based on a thorough discussion of this condition and the management approach to it (including a comprehensive discussion of the known risks, side effects and potential benefits of treatment), the patient (family) agrees to implement the following specific plan:    • Recommend avoiding shaving in the armpits and groin  • Discussed smoking cessation leads to improvement  • Start Doxycycline 100 mg BID  • Discussed that patient has no skin cancers, no history of MS or CHF   • Will initiate topical treatment as below in the interim:  o Start to use clindamycin-benzoyl peroxide gel  Apply twice a day to all affected areas on the skin; Bilateral flanks and armpits  Apply this in the morning and evening    o Start to use over the counter Cetaphil gentle wash  (Below is a picture) Apply daily when showering to all affected areas on the armpits, bilateral flanks, and anywhere that boils/cysts appear  o Follow up in 3 months  •         A[place] (\"SKIN TAG\")    Physical Exam:  • Anatomic Location Affected:  Left back  • Morphological Description:  Skin colored papule  • Pertinent Positives:  • Pertinent Negatives:     Additional History of Present Condition:      Assessment and Plan:  Based on a thorough discussion of this condition and the management approach to it (including a comprehensive discussion of the known risks, side effects and potential benefits of treatment), the patient (family) agrees to implement the following specific plan:  • Reassured benign      "

## 2023-03-30 ENCOUNTER — OFFICE VISIT (OUTPATIENT)
Dept: FAMILY MEDICINE CLINIC | Facility: CLINIC | Age: 38
End: 2023-03-30

## 2023-03-30 VITALS
WEIGHT: 224 LBS | HEART RATE: 107 BPM | HEIGHT: 68 IN | DIASTOLIC BLOOD PRESSURE: 84 MMHG | SYSTOLIC BLOOD PRESSURE: 152 MMHG | OXYGEN SATURATION: 98 % | BODY MASS INDEX: 33.95 KG/M2 | TEMPERATURE: 98.6 F | RESPIRATION RATE: 16 BRPM

## 2023-03-30 DIAGNOSIS — R03.0 ELEVATED BLOOD PRESSURE READING: Primary | ICD-10-CM

## 2023-03-30 DIAGNOSIS — F41.9 ANXIETY: ICD-10-CM

## 2023-03-30 RX ORDER — LISINOPRIL 10 MG/1
10 TABLET ORAL DAILY
Qty: 30 TABLET | Refills: 0 | Status: SHIPPED | OUTPATIENT
Start: 2023-03-30

## 2023-03-30 NOTE — PATIENT INSTRUCTIONS
- Start checking blood pressure daily  If blood pressure is >140/90, take lisinopril 10 mg one tablet  - If you are experiencing headaches, blurry vision, please check the blood pressure  If >140/90, take lisinopril 10 mg one tablet  - If you are experiencing chest pain and/or blood pressure > 200/100, please call the office, call 911 or go to the emergency room  - Please call your psychiatrist to receive refill of klonopin

## 2023-04-24 DIAGNOSIS — R03.0 ELEVATED BLOOD PRESSURE READING: ICD-10-CM

## 2023-04-24 RX ORDER — LISINOPRIL 10 MG/1
10 TABLET ORAL DAILY
Qty: 30 TABLET | Refills: 0 | Status: SHIPPED | OUTPATIENT
Start: 2023-04-24

## 2023-05-03 DIAGNOSIS — L73.2 HIDRADENITIS SUPPURATIVA: ICD-10-CM

## 2023-05-04 RX ORDER — DOXYCYCLINE 100 MG/1
CAPSULE ORAL
Qty: 60 CAPSULE | Refills: 2 | Status: SHIPPED | OUTPATIENT
Start: 2023-05-04 | End: 2024-05-04

## 2023-05-08 ENCOUNTER — TELEPHONE (OUTPATIENT)
Dept: FAMILY MEDICINE CLINIC | Facility: CLINIC | Age: 38
End: 2023-05-08

## 2023-05-08 NOTE — TELEPHONE ENCOUNTER
first attempt to contact patient   left message to return my call on answering machine    RESCHEDULE 05/23/2023 APPT

## 2023-05-12 NOTE — TELEPHONE ENCOUNTER
third attempt to contact patient   left message to return my call on answering machine    5/23/23 APPT CANCELED   LETTER SENT

## 2023-05-18 DIAGNOSIS — R03.0 ELEVATED BLOOD PRESSURE READING: ICD-10-CM

## 2023-05-22 RX ORDER — LISINOPRIL 10 MG/1
10 TABLET ORAL DAILY
Qty: 30 TABLET | Refills: 0 | Status: SHIPPED | OUTPATIENT
Start: 2023-05-22

## 2023-06-16 DIAGNOSIS — R03.0 ELEVATED BLOOD PRESSURE READING: ICD-10-CM

## 2023-06-16 RX ORDER — LISINOPRIL 10 MG/1
10 TABLET ORAL DAILY
Qty: 30 TABLET | Refills: 0 | Status: SHIPPED | OUTPATIENT
Start: 2023-06-16

## 2023-07-11 DIAGNOSIS — R03.0 ELEVATED BLOOD PRESSURE READING: ICD-10-CM

## 2023-07-13 RX ORDER — LISINOPRIL 10 MG/1
10 TABLET ORAL DAILY
Qty: 30 TABLET | Refills: 0 | Status: SHIPPED | OUTPATIENT
Start: 2023-07-13

## 2023-07-18 DIAGNOSIS — L73.2 HIDRADENITIS SUPPURATIVA: ICD-10-CM

## 2023-07-19 RX ORDER — CLINDAMYCIN PHOSPHATE AND BENZOYL PEROXIDE 10; 50 MG/G; MG/G
GEL TOPICAL
Qty: 45 G | Refills: 5 | Status: SHIPPED | OUTPATIENT
Start: 2023-07-19

## 2023-07-19 RX ORDER — DOXYCYCLINE 100 MG/1
CAPSULE ORAL
Qty: 60 CAPSULE | Refills: 2 | Status: SHIPPED | OUTPATIENT
Start: 2023-07-19 | End: 2023-09-19

## 2023-07-28 DIAGNOSIS — R03.0 ELEVATED BLOOD PRESSURE READING: ICD-10-CM

## 2023-08-01 RX ORDER — LISINOPRIL 10 MG/1
10 TABLET ORAL DAILY
Qty: 30 TABLET | Refills: 0 | OUTPATIENT
Start: 2023-08-01

## 2023-08-15 DIAGNOSIS — L73.2 HIDRADENITIS SUPPURATIVA: ICD-10-CM

## 2023-08-18 RX ORDER — DOXYCYCLINE 100 MG/1
CAPSULE ORAL
Qty: 60 CAPSULE | Refills: 2 | OUTPATIENT
Start: 2023-08-18

## 2023-08-19 ENCOUNTER — HOSPITAL ENCOUNTER (EMERGENCY)
Facility: HOSPITAL | Age: 38
Discharge: HOME | End: 2023-08-19
Attending: EMERGENCY MEDICINE | Admitting: EMERGENCY MEDICINE
Payer: COMMERCIAL

## 2023-08-19 ENCOUNTER — APPOINTMENT (EMERGENCY)
Dept: RADIOLOGY | Facility: HOSPITAL | Age: 38
End: 2023-08-19
Payer: COMMERCIAL

## 2023-08-19 VITALS
OXYGEN SATURATION: 93 % | WEIGHT: 204.9 LBS | BODY MASS INDEX: 31.15 KG/M2 | DIASTOLIC BLOOD PRESSURE: 71 MMHG | TEMPERATURE: 98.4 F | RESPIRATION RATE: 20 BRPM | HEART RATE: 88 BPM | SYSTOLIC BLOOD PRESSURE: 129 MMHG

## 2023-08-19 DIAGNOSIS — S61.211A LACERATION OF LEFT INDEX FINGER WITHOUT FOREIGN BODY WITHOUT DAMAGE TO NAIL, INITIAL ENCOUNTER: Primary | ICD-10-CM

## 2023-08-19 PROCEDURE — 90715 TDAP VACCINE 7 YRS/> IM: CPT

## 2023-08-19 PROCEDURE — 73140 X-RAY EXAM OF FINGER(S): CPT

## 2023-08-19 PROCEDURE — 99284 EMERGENCY DEPT VISIT MOD MDM: CPT | Performed by: EMERGENCY MEDICINE

## 2023-08-19 PROCEDURE — 90471 IMMUNIZATION ADMIN: CPT

## 2023-08-19 PROCEDURE — 99283 EMERGENCY DEPT VISIT LOW MDM: CPT

## 2023-08-19 RX ORDER — IBUPROFEN 600 MG/1
600 TABLET ORAL ONCE
Status: COMPLETED | OUTPATIENT
Start: 2023-08-19 | End: 2023-08-19

## 2023-08-19 RX ORDER — IBUPROFEN 400 MG/1
400 TABLET ORAL EVERY 6 HOURS PRN
Qty: 20 TABLET | Refills: 0 | Status: SHIPPED | OUTPATIENT
Start: 2023-08-19

## 2023-08-19 RX ORDER — BUPRENORPHINE AND NALOXONE 8; 2 MG/1; MG/1
FILM, SOLUBLE BUCCAL; SUBLINGUAL
COMMUNITY
Start: 2023-07-27

## 2023-08-19 RX ORDER — PALIPERIDONE PALMITATE 156 MG/ML
INJECTION INTRAMUSCULAR
COMMUNITY
Start: 2023-06-01

## 2023-08-19 RX ADMIN — TETANUS TOXOID, REDUCED DIPHTHERIA TOXOID AND ACELLULAR PERTUSSIS VACCINE, ADSORBED 0.5 ML: 5; 2.5; 8; 8; 2.5 SUSPENSION INTRAMUSCULAR at 11:40

## 2023-08-19 RX ADMIN — IBUPROFEN 600 MG: 600 TABLET, FILM COATED ORAL at 11:41

## 2023-08-19 NOTE — DISCHARGE INSTRUCTIONS
Follow wound care instructions discussed, including daily dressings. Follow-up with primary care provider for wound recheck. Return to ED for any worsening symptoms as discussed.

## 2023-08-19 NOTE — ED PROVIDER NOTES
History  Chief Complaint   Patient presents with   • Finger Laceration     Patient cut himself by accident right 2nd digit, reports he did not want to come in for this but it did not stop bleeding yesterday. Tetanus 5yrs ago , refusing any vaccines for today. 41 y.o. M presents to ED for laceration of L index finger. States he went to start making dinner when he cut his finger with a clean knife. This occurred 17-20 hours PTA. States he did not want to come in yesterday but that it still bleeds a little if he bends it. History provided by:  Patient and significant other  Laceration  Location:  Finger  Finger laceration location:  L index finger  Length:  2.5 cm   Depth: Through underlying tissue  Quality: straight    Bleeding: controlled with pressure    Laceration mechanism:  Knife  Worsened by: Movement  Tetanus status:  Unknown (per chart review 5 years ago )  Associated symptoms: no fever, no focal weakness, no numbness, no rash, no redness, no swelling and no streaking    Associated symptoms comment:  Denies decreased ROM       Prior to Admission Medications   Prescriptions Last Dose Informant Patient Reported? Taking? Clindamycin Phos-Benzoyl Perox gel   No No   Sig: APPLY TWICE A DAY TO THE ARMPITS AND GROIN   Invega Sustenna 156 MG/ML IM injection   Yes No   Sig: INJECT 117 MG INTRAMUSCULARLY EVERY 4 WEEKS   buPROPion (WELLBUTRIN) 75 mg tablet   Yes No   Sig: Take 75 mg by mouth daily   buprenorphine-naloxone (Suboxone) 8-2 mg   Yes No   Si FILM SUBLINGUAL TWICE DAILY   clonazePAM (KlonoPIN) 1 mg tablet   Yes No   Sig: TAKE 1 TABLET BY ORAL ROUTE 2 TIMES PER DAY AS NEEDED FOR ANXIETY   doxycycline monohydrate (MONODOX) 100 mg capsule   No No   Sig: TAKE 1 CAPSULE BY MOUTH TWICE A DAY.  TAKE WITH A FULL MEAL AND A FULL GLASS OF WATER.   hydrOXYzine HCL (ATARAX) 50 mg tablet   Yes No   Sig: Take 50 mg by mouth daily at bedtime   lisinopril (ZESTRIL) 10 mg tablet   No No   Sig: TAKE 1 TABLET (10 MG TOTAL) BY MOUTH DAILY   naloxone (NARCAN) 4 mg/0.1 mL nasal spray   Yes No   Si PUFFS NASAL AS NEEDED   nicotine polacrilex (NICORETTE) 4 mg gum   Yes No   Si GUM ORAL TRANSMUCOSAL 4 TIMES PER DAY ,AS NEEDED   pantoprazole (PROTONIX) 20 mg tablet   No No   Sig: Take 1 tablet (20 mg total) by mouth daily before breakfast   rosuvastatin (CRESTOR) 10 MG tablet   No No   Sig: Take 1 tablet (10 mg total) by mouth daily at bedtime   senna (SENOKOT) 8.6 mg   Yes No   Si TABLET ORAL TWICE DAILY      Facility-Administered Medications: None       Past Medical History:   Diagnosis Date   • Meningitis        Past Surgical History:   Procedure Laterality Date   • PILONIDAL CYST / SINUS EXCISION     • SKIN GRAFT      right hand       Family History   Problem Relation Age of Onset   • Diabetes Mother    • Hypertension Mother    • Diabetes Father    • Hypertension Father      I have reviewed and agree with the history as documented. E-Cigarette/Vaping   • E-Cigarette Use Current Every Day User      E-Cigarette/Vaping Substances   • Nicotine Yes    • THC No    • CBD No    • Flavoring No    • Other No    • Unknown No      Social History     Tobacco Use   • Smoking status: Every Day     Packs/day: 1.00     Types: Cigarettes   • Smokeless tobacco: Never   Vaping Use   • Vaping Use: Every day   • Substances: Nicotine   Substance Use Topics   • Alcohol use: No   • Drug use: No       Review of Systems   Constitutional: Negative for chills and fever. Musculoskeletal: Negative for joint swelling. Skin: Positive for wound. Negative for color change and rash. Neurological: Negative for focal weakness, weakness and numbness. All other systems reviewed and are negative. Physical Exam  Physical Exam  Vitals and nursing note reviewed. Constitutional:       General: He is not in acute distress. Appearance: Normal appearance. He is not ill-appearing. HENT:      Head: Normocephalic and atraumatic. Cardiovascular:      Rate and Rhythm: Normal rate and regular rhythm. Pulmonary:      Effort: Pulmonary effort is normal.   Musculoskeletal:      Left hand: Laceration (1.5 cm, deep, bleeding controlled. does not cross joint line.  ) and bony tenderness present. No swelling. Normal range of motion. Normal strength. Normal sensation. There is no disruption of two-point discrimination. Normal capillary refill. Normal pulse. Hands:       Cervical back: Neck supple. Skin:     General: Skin is warm and dry. Capillary Refill: Capillary refill takes less than 2 seconds. Findings: Laceration present. No bruising, erythema or rash. Neurological:      Mental Status: He is alert. Gait: Gait normal.   Psychiatric:         Mood and Affect: Mood normal.         Behavior: Behavior normal.         Vital Signs  ED Triage Vitals [08/19/23 1116]   Temperature Pulse Respirations Blood Pressure SpO2   98.4 °F (36.9 °C) 88 20 129/71 93 %      Temp Source Heart Rate Source Patient Position - Orthostatic VS BP Location FiO2 (%)   Oral Monitor Sitting Left arm --      Pain Score       --           Vitals:    08/19/23 1116   BP: 129/71   Pulse: 88   Patient Position - Orthostatic VS: Sitting         Visual Acuity      ED Medications  Medications   tetanus-diphtheria-acellular pertussis (BOOSTRIX) IM injection 0.5 mL (0.5 mL Intramuscular Given 8/19/23 1140)   ibuprofen (MOTRIN) tablet 600 mg (600 mg Oral Given 8/19/23 1141)       Diagnostic Studies  Results Reviewed     None                 XR finger second digit-index LEFT   ED Interpretation by Gigi Handy PA-C (08/19 1140)   No acute fx                  Procedures  Procedures         ED Course  ED Course as of 08/19/23 1221   Sat Aug 19, 2023   1121 Last tdap 5 years ago, patient refusing new vaccination. 1128 Laceration to left hand second digit via clean kitchen knife. agreeable to tetanus. States this happened around 6 PM yesterday.   Does report bony tenderness. Denies decreased range of motion. Denies numbness. Is right-handed. Full extension. 1140 Imaging review done by myself and preliminary results were discussed with the patient and family members. Discussion was had with patient and family members that this read is preliminary and therefore discrepancies between this read and the final read by the radiologist are possible. Patient and family members were informed that any discrepancies found by would be relayed by phone call. SBIRT 22yo+    Flowsheet Row Most Recent Value   Initial Alcohol Screen: US AUDIT-C     1. How often do you have a drink containing alcohol? 0 Filed at: 08/19/2023 1119   2. How many drinks containing alcohol do you have on a typical day you are drinking? 0 Filed at: 08/19/2023 1119   3a. Male UNDER 65: How often do you have five or more drinks on one occasion? 0 Filed at: 08/19/2023 1119   3b. FEMALE Any Age, or MALE 65+: How often do you have 4 or more drinks on one occassion? 0 Filed at: 08/19/2023 1119   Audit-C Score 0 Filed at: 08/19/2023 1119   ELISEO: How many times in the past year have you. .. Used an illegal drug or used a prescription medication for non-medical reasons? Never Filed at: 08/19/2023 1119                    Medical Decision Making  Laceration to second digit of left hand. Bleeding controlled. No evidence of nerve or tendon involvement as on exam sensation, range of motion and strength are all intact. Distal perfusion intact. No surrounding erythema or swelling or drainage concerning for infection. Afebrile. No tachycardia. Tdap updated. Xray without evidence of open fx on my wet read. Due to laceration being greater than 12 hours old, will allow to heal by secondary intention. Wound thoroughly cleansed in ED. Bulky,  Wet dressing applied. recommended wound check in 48 hours by PCP. Educated on wound care, return precautions.     All imaging and/or lab testing discussed with patient, strict return to ED precautions discussed. Patient recommended to follow up promptly with appropriate outpatient provider. Patient and/or family members verbalizes understanding and agrees with plan. Patient and/or family members were given opportunity to ask questions, all questions were answered at this time. Patient is stable for discharge.     Portions of the record may have been created with voice recognition software. Occasional wrong word or "sound a like" substitutions may have occurred due to the inherent limitations of voice recognition software. Read the chart carefully and recognize, using context, where substitutions have occurred. Amount and/or Complexity of Data Reviewed  Radiology: ordered. Risk  Prescription drug management. Disposition  Final diagnoses:   Laceration of left index finger without foreign body without damage to nail, initial encounter     Time reflects when diagnosis was documented in both MDM as applicable and the Disposition within this note     Time User Action Codes Description Comment    8/19/2023 11:40 AM Anu Larkin Add [L60.858X] Laceration of left index finger without foreign body without damage to nail, initial encounter       ED Disposition     ED Disposition   Discharge    Condition   Stable    Date/Time   Sat Aug 19, 2023 11:46 AM    Comment   [facility] discharge to home/self care.                Follow-up Information     Follow up With Specialties Details Why Leland Valderrama MD Family Medicine Schedule an appointment as soon as possible for a visit in 2 days For wound re-check [address] [address]  [phone]            Discharge Medication List as of 8/19/2023 11:56 AM      START taking these medications    Details   ibuprofen (MOTRIN) 400 mg tablet Take 1 tablet (400 mg total) by mouth every 6 (six) hours as needed for moderate pain, Starting Sat 8/19/2023, Normal CONTINUE these medications which have NOT CHANGED    Details   buprenorphine-naloxone (Suboxone) 8-2 mg 1 FILM SUBLINGUAL TWICE DAILY, Historical Med      buPROPion (WELLBUTRIN) 75 mg tablet Take 75 mg by mouth daily, Starting Fri 11/25/2022, Historical Med      Clindamycin Phos-Benzoyl Perox gel APPLY TWICE A DAY TO THE ARMPITS AND GROIN, Normal      clonazePAM (KlonoPIN) 1 mg tablet TAKE 1 TABLET BY ORAL ROUTE 2 TIMES PER DAY AS NEEDED FOR ANXIETY, Historical Med      doxycycline monohydrate (MONODOX) 100 mg capsule TAKE 1 CAPSULE BY MOUTH TWICE A DAY. TAKE WITH A FULL MEAL AND A FULL GLASS OF WATER., Normal      hydrOXYzine HCL (ATARAX) 50 mg tablet Take 50 mg by mouth daily at bedtime, Starting Mon 3/6/2023, Historical Med      Invega Sustenna 156 MG/ML IM injection INJECT 117 MG INTRAMUSCULARLY EVERY 4 WEEKS, Historical Med      lisinopril (ZESTRIL) 10 mg tablet TAKE 1 TABLET (10 MG TOTAL) BY MOUTH DAILY, Starting Thu 7/13/2023, Normal      naloxone (NARCAN) 4 mg/0.1 mL nasal spray 1 PUFFS NASAL AS NEEDED, Historical Med      nicotine polacrilex (NICORETTE) 4 mg gum 1 GUM ORAL TRANSMUCOSAL 4 TIMES PER DAY ,AS NEEDED, Historical Med      pantoprazole (PROTONIX) 20 mg tablet Take 1 tablet (20 mg total) by mouth daily before breakfast, Starting Thu 2/23/2023, Normal      rosuvastatin (CRESTOR) 10 MG tablet Take 1 tablet (10 mg total) by mouth daily at bedtime, Starting Thu 2/23/2023, Normal      senna (SENOKOT) 8.6 mg 2 TABLET ORAL TWICE DAILY, Historical Med             No discharge procedures on file.     PDMP Review     None          ED Provider  Electronically Signed by           Ruthie Morales PA-C  08/19/23 4058

## 2023-09-20 ENCOUNTER — HOSPITAL ENCOUNTER (EMERGENCY)
Facility: HOSPITAL | Age: 38
Discharge: HOME | End: 2023-09-20
Attending: EMERGENCY MEDICINE
Payer: COMMERCIAL

## 2023-09-20 ENCOUNTER — APPOINTMENT (EMERGENCY)
Dept: RADIOLOGY | Facility: HOSPITAL | Age: 38
End: 2023-09-20
Payer: COMMERCIAL

## 2023-09-20 VITALS
TEMPERATURE: 98.2 F | WEIGHT: 204 LBS | RESPIRATION RATE: 18 BRPM | BODY MASS INDEX: 31.02 KG/M2 | DIASTOLIC BLOOD PRESSURE: 57 MMHG | OXYGEN SATURATION: 100 % | SYSTOLIC BLOOD PRESSURE: 117 MMHG | HEART RATE: 81 BPM

## 2023-09-20 DIAGNOSIS — F19.10 SUBSTANCE ABUSE (HCC): ICD-10-CM

## 2023-09-20 DIAGNOSIS — E87.6 HYPOKALEMIA: ICD-10-CM

## 2023-09-20 DIAGNOSIS — T50.901A ACCIDENTAL OVERDOSE, INITIAL ENCOUNTER: Primary | ICD-10-CM

## 2023-09-20 LAB
ALBUMIN SERPL BCP-MCNC: 4.4 G/DL (ref 3.5–5)
ALP SERPL-CCNC: 72 U/L (ref 34–104)
ALT SERPL W P-5'-P-CCNC: 49 U/L (ref 7–52)
ANION GAP SERPL CALCULATED.3IONS-SCNC: 11 MMOL/L
AST SERPL W P-5'-P-CCNC: 43 U/L (ref 13–39)
ATRIAL RATE: 84 BPM
BASOPHILS # BLD AUTO: 0.05 THOUSANDS/ÂΜL (ref 0–0.1)
BASOPHILS NFR BLD AUTO: 1 % (ref 0–1)
BILIRUB SERPL-MCNC: 0.66 MG/DL (ref 0.2–1)
BUN SERPL-MCNC: 15 MG/DL (ref 5–25)
CALCIUM SERPL-MCNC: 9.1 MG/DL (ref 8.4–10.2)
CARDIAC TROPONIN I PNL SERPL HS: 3 NG/L
CHLORIDE SERPL-SCNC: 103 MMOL/L (ref 96–108)
CO2 SERPL-SCNC: 25 MMOL/L (ref 21–32)
CREAT SERPL-MCNC: 0.85 MG/DL (ref 0.6–1.3)
EOSINOPHIL # BLD AUTO: 0.31 THOUSAND/ÂΜL (ref 0–0.61)
EOSINOPHIL NFR BLD AUTO: 3 % (ref 0–6)
ERYTHROCYTE [DISTWIDTH] IN BLOOD BY AUTOMATED COUNT: 13 % (ref 11.6–15.1)
ETHANOL SERPL-MCNC: <10 MG/DL
GFR SERPL CREATININE-BSD FRML MDRD: 111 ML/MIN/1.73SQ M
GLUCOSE SERPL-MCNC: 136 MG/DL (ref 65–140)
HCT VFR BLD AUTO: 41.9 % (ref 36.5–49.3)
HGB BLD-MCNC: 14.1 G/DL (ref 12–17)
IMM GRANULOCYTES # BLD AUTO: 0.03 THOUSAND/UL (ref 0–0.2)
IMM GRANULOCYTES NFR BLD AUTO: 0 % (ref 0–2)
LYMPHOCYTES # BLD AUTO: 2.85 THOUSANDS/ÂΜL (ref 0.6–4.47)
LYMPHOCYTES NFR BLD AUTO: 30 % (ref 14–44)
MCH RBC QN AUTO: 28.3 PG (ref 26.8–34.3)
MCHC RBC AUTO-ENTMCNC: 33.7 G/DL (ref 31.4–37.4)
MCV RBC AUTO: 84 FL (ref 82–98)
MONOCYTES # BLD AUTO: 0.59 THOUSAND/ÂΜL (ref 0.17–1.22)
MONOCYTES NFR BLD AUTO: 6 % (ref 4–12)
NEUTROPHILS # BLD AUTO: 5.56 THOUSANDS/ÂΜL (ref 1.85–7.62)
NEUTS SEG NFR BLD AUTO: 60 % (ref 43–75)
NRBC BLD AUTO-RTO: 0 /100 WBCS
P AXIS: 65 DEGREES
PLATELET # BLD AUTO: 301 THOUSANDS/UL (ref 149–390)
PMV BLD AUTO: 9.8 FL (ref 8.9–12.7)
POTASSIUM SERPL-SCNC: 3.2 MMOL/L (ref 3.5–5.3)
PR INTERVAL: 158 MS
PROT SERPL-MCNC: 7.2 G/DL (ref 6.4–8.4)
QRS AXIS: 80 DEGREES
QRSD INTERVAL: 100 MS
QT INTERVAL: 382 MS
QTC INTERVAL: 451 MS
RBC # BLD AUTO: 4.98 MILLION/UL (ref 3.88–5.62)
SODIUM SERPL-SCNC: 139 MMOL/L (ref 135–147)
T WAVE AXIS: 48 DEGREES
VENTRICULAR RATE: 84 BPM
WBC # BLD AUTO: 9.39 THOUSAND/UL (ref 4.31–10.16)

## 2023-09-20 PROCEDURE — 36415 COLL VENOUS BLD VENIPUNCTURE: CPT | Performed by: EMERGENCY MEDICINE

## 2023-09-20 PROCEDURE — 93010 ELECTROCARDIOGRAM REPORT: CPT

## 2023-09-20 PROCEDURE — 82077 ASSAY SPEC XCP UR&BREATH IA: CPT | Performed by: EMERGENCY MEDICINE

## 2023-09-20 PROCEDURE — 80053 COMPREHEN METABOLIC PANEL: CPT | Performed by: EMERGENCY MEDICINE

## 2023-09-20 PROCEDURE — 93005 ELECTROCARDIOGRAM TRACING: CPT

## 2023-09-20 PROCEDURE — 84484 ASSAY OF TROPONIN QUANT: CPT | Performed by: EMERGENCY MEDICINE

## 2023-09-20 PROCEDURE — 99284 EMERGENCY DEPT VISIT MOD MDM: CPT

## 2023-09-20 PROCEDURE — 96361 HYDRATE IV INFUSION ADD-ON: CPT

## 2023-09-20 PROCEDURE — 71046 X-RAY EXAM CHEST 2 VIEWS: CPT

## 2023-09-20 PROCEDURE — 85025 COMPLETE CBC W/AUTO DIFF WBC: CPT | Performed by: EMERGENCY MEDICINE

## 2023-09-20 PROCEDURE — 99284 EMERGENCY DEPT VISIT MOD MDM: CPT | Performed by: EMERGENCY MEDICINE

## 2023-09-20 PROCEDURE — 96360 HYDRATION IV INFUSION INIT: CPT

## 2023-09-20 RX ORDER — POTASSIUM CHLORIDE 750 MG/1
40 TABLET, EXTENDED RELEASE ORAL ONCE
Status: COMPLETED | OUTPATIENT
Start: 2023-09-20 | End: 2023-09-20

## 2023-09-20 RX ORDER — NALOXONE HYDROCHLORIDE 1 MG/ML
INJECTION INTRAMUSCULAR; INTRAVENOUS; SUBCUTANEOUS
Status: COMPLETED
Start: 2023-09-20 | End: 2023-09-20

## 2023-09-20 RX ORDER — POTASSIUM CHLORIDE 750 MG/1
10 TABLET, EXTENDED RELEASE ORAL 2 TIMES DAILY
Qty: 20 TABLET | Refills: 0 | Status: SHIPPED | OUTPATIENT
Start: 2023-09-20

## 2023-09-20 RX ADMIN — POTASSIUM CHLORIDE 40 MEQ: 750 TABLET, EXTENDED RELEASE ORAL at 07:44

## 2023-09-20 RX ADMIN — NALOXONE HYDROCHLORIDE 4 MG: 4 SPRAY NASAL at 08:29

## 2023-09-20 RX ADMIN — SODIUM CHLORIDE 1000 ML: 0.9 INJECTION, SOLUTION INTRAVENOUS at 05:55

## 2023-09-20 NOTE — Clinical Note
Dion Ramirez was seen and treated in our emergency department on 9/20/2023. Diagnosis:     Abimelec  . He may return on this date: If you have any questions or concerns, please don't hesitate to call.       Jay Trejo MD    ______________________________           _______________          _______________  Hospital Representative                              Date                                Time

## 2023-09-20 NOTE — Clinical Note
accompanied Isauro Mott to the emergency department on 9/20/2023. Return date if applicable: 30/37/4950        If you have any questions or concerns, please don't hesitate to call.       Babatunde Fontaine MD

## 2023-09-20 NOTE — ED NOTES
Assumed care for pt. Pt awake with no s/s of distress observed. Pt's family at bedside.       Paz Blanca RN  09/20/23 4089

## 2023-09-20 NOTE — ED NOTES
Pt requesting to discharge, also requesting community narcan upon discharge. Provider made aware.       Jennifer Sharma RN  09/20/23 1535

## 2023-09-20 NOTE — Clinical Note
accompanied Tim Rodgers to the emergency department on 9/20/2023. Return date if applicable: 03/09/0651        If you have any questions or concerns, please don't hesitate to call.       Valentin Sanford MD

## 2023-09-20 NOTE — Clinical Note
Chi Hernandez was seen and treated in our emergency department on 9/20/2023. Diagnosis:     Abimelec  . He may return on this date: If you have any questions or concerns, please don't hesitate to call.       Heather Bennett MD    ______________________________           _______________          _______________  Hospital Representative                              Date                                Time

## 2023-09-20 NOTE — ED PROVIDER NOTES
History  No chief complaint on file. 71-year-old gentleman presents after suspected opiate overdose. Patient was reportedly found unresponsive. Family gave the patient 2 doses of 4 mg of intranasal Narcan. Police report that when they arrived on scene the patient was pulseless and apneic. They initiated CPR at that time. On EMS arrival, they began bagging the patient and gave to 1 mg aliquots of intravenous Narcan. Patient awoke and is complaining only of mild chest discomfort. Patient admits to having history of fentanyl abuse but reports that he has been clean for the past 2 years since he started on Suboxone. Patient takes Klonopin for anxiety and family reportedly told EMS that he purchased what he thought was Xanax off the street. The patient does not recall this and is denying any known use of other illicit substances. Overdose - Accidental  Incident location:  Home  Context comment:  Unknown  Associated symptoms: altered mental status, chest pain, lethargy and unresponsiveness        Cannot display prior to admission medications because the patient has not been admitted in this contact. Past Medical History:   Diagnosis Date   • Meningitis        Past Surgical History:   Procedure Laterality Date   • PILONIDAL CYST / SINUS EXCISION     • SKIN GRAFT      right hand       Family History   Problem Relation Age of Onset   • Diabetes Mother    • Hypertension Mother    • Diabetes Father    • Hypertension Father      I have reviewed and agree with the history as documented.     E-Cigarette/Vaping   • E-Cigarette Use Current Every Day User      E-Cigarette/Vaping Substances   • Nicotine Yes    • THC No    • CBD No    • Flavoring No    • Other No    • Unknown No      Social History     Tobacco Use   • Smoking status: Every Day     Packs/day: 1.00     Types: Cigarettes   • Smokeless tobacco: Never   Vaping Use   • Vaping Use: Every day   • Substances: Nicotine   Substance Use Topics   • Alcohol use: No   • Drug use: No       Review of Systems   Cardiovascular: Positive for chest pain. All other systems reviewed and are negative. Physical Exam  Physical Exam  Vitals and nursing note reviewed. Constitutional:       General: He is not in acute distress. Appearance: Normal appearance. He is well-developed. He is not ill-appearing, toxic-appearing or diaphoretic. HENT:      Head: Normocephalic and atraumatic. Right Ear: External ear normal.      Left Ear: External ear normal.      Nose: Nose normal.      Mouth/Throat:      Mouth: Mucous membranes are moist.      Pharynx: Oropharynx is clear. Eyes:      Conjunctiva/sclera: Conjunctivae normal.      Pupils: Pupils are equal, round, and reactive to light. Cardiovascular:      Rate and Rhythm: Normal rate and regular rhythm. Heart sounds: Normal heart sounds. Pulmonary:      Effort: Pulmonary effort is normal. No respiratory distress. Breath sounds: Normal breath sounds. Abdominal:      General: Bowel sounds are normal. There is no distension. Palpations: Abdomen is soft. Tenderness: There is no abdominal tenderness. There is no guarding. Musculoskeletal:         General: Normal range of motion. Cervical back: Neck supple. No rigidity. Right lower leg: No edema. Left lower leg: No edema. Skin:     General: Skin is warm and dry. Capillary Refill: Capillary refill takes less than 2 seconds. Neurological:      General: No focal deficit present. Mental Status: He is alert and oriented to person, place, and time. Psychiatric:         Attention and Perception: Attention and perception normal.         Mood and Affect: Mood normal.         Behavior: Behavior normal. Behavior is cooperative. Thought Content:  Thought content normal.         Vital Signs  ED Triage Vitals   Temp Pulse Resp BP SpO2   -- -- -- -- --      Temp src Heart Rate Source Patient Position - Orthostatic VS BP Location FiO2 (%)   -- -- -- -- --      Pain Score       --           There were no vitals filed for this visit. Visual Acuity      ED Medications  Medications - No data to display    Diagnostic Studies  Results Reviewed     None                 No orders to display              Procedures  ECG 12 Lead Documentation Only    Date/Time: 9/20/2023 5:20 AM    Performed by: David Sotelo DO  Authorized by: David Sotelo DO    ECG reviewed by me, the ED Provider: yes    Patient location:  ED  Rate:     ECG rate:  84    ECG rate assessment: normal    Rhythm:     Rhythm: sinus rhythm    Ectopy:     Ectopy: none    QRS:     QRS axis:  Normal  Conduction:     Conduction: normal    ST segments:     ST segments:  Normal  T waves:     T waves: normal               ED Course                                             MDM    Disposition  Final diagnoses:   None     ED Disposition     None      Follow-up Information    None         Patient's Medications   Discharge Prescriptions    No medications on file       No discharge procedures on file.     PDMP Review     None          ED Provider  Electronically Signed by           David Sotelo DO  09/24/23 5097

## 2023-12-02 DIAGNOSIS — E78.2 MIXED HYPERLIPIDEMIA: ICD-10-CM

## 2023-12-04 RX ORDER — ROSUVASTATIN CALCIUM 10 MG/1
10 TABLET, COATED ORAL
Qty: 90 TABLET | Refills: 2 | OUTPATIENT
Start: 2023-12-04

## 2023-12-04 NOTE — TELEPHONE ENCOUNTER
Patient was supposed to come for an appointment in May but didn't  Needs follow up appointment for f/u BP and heartburn with any available provider

## 2024-01-23 DIAGNOSIS — K21.9 GASTROESOPHAGEAL REFLUX DISEASE WITHOUT ESOPHAGITIS: ICD-10-CM

## 2024-01-23 DIAGNOSIS — L73.2 HIDRADENITIS SUPPURATIVA: ICD-10-CM

## 2024-01-24 RX ORDER — PANTOPRAZOLE SODIUM 20 MG/1
20 TABLET, DELAYED RELEASE ORAL
Qty: 30 TABLET | Refills: 2 | Status: SHIPPED | OUTPATIENT
Start: 2024-01-24

## 2024-01-24 RX ORDER — CLINDAMYCIN PHOSPHATE AND BENZOYL PEROXIDE 10; 50 MG/G; MG/G
GEL TOPICAL
Qty: 45 G | Refills: 5 | Status: SHIPPED | OUTPATIENT
Start: 2024-01-24

## 2024-02-23 DIAGNOSIS — L73.2 HIDRADENITIS SUPPURATIVA: ICD-10-CM

## 2024-02-26 RX ORDER — DOXYCYCLINE 100 MG/1
CAPSULE ORAL
Qty: 60 CAPSULE | Refills: 2 | OUTPATIENT
Start: 2024-02-26

## 2024-02-29 NOTE — TELEPHONE ENCOUNTER
I called and spoke with patient. Patient said that he can come in late morning only so he denied next available with Dr. Kennedy on 4/17 at 8am. Patient was scheduled with Dr. Anderson on 6/19.

## 2024-03-19 DIAGNOSIS — K21.9 GASTROESOPHAGEAL REFLUX DISEASE WITHOUT ESOPHAGITIS: ICD-10-CM

## 2024-03-19 RX ORDER — PANTOPRAZOLE SODIUM 20 MG/1
20 TABLET, DELAYED RELEASE ORAL
Qty: 30 TABLET | Refills: 2 | Status: SHIPPED | OUTPATIENT
Start: 2024-03-19

## 2024-05-07 DIAGNOSIS — L73.2 HIDRADENITIS SUPPURATIVA: ICD-10-CM

## 2024-05-15 RX ORDER — CLINDAMYCIN PHOSPHATE AND BENZOYL PEROXIDE 10; 50 MG/G; MG/G
GEL TOPICAL
Qty: 45 G | Refills: 5 | Status: SHIPPED | OUTPATIENT
Start: 2024-05-15

## 2024-06-19 ENCOUNTER — TELEPHONE (OUTPATIENT)
Dept: INTERNAL MEDICINE CLINIC | Facility: CLINIC | Age: 39
End: 2024-06-19

## 2024-10-02 DIAGNOSIS — L73.2 HIDRADENITIS SUPPURATIVA: ICD-10-CM

## 2024-10-02 RX ORDER — CLINDAMYCIN PHOSPHATE AND BENZOYL PEROXIDE 10; 50 MG/G; MG/G
GEL TOPICAL
Qty: 45 G | Refills: 5 | Status: SHIPPED | OUTPATIENT
Start: 2024-10-02

## 2025-02-27 DIAGNOSIS — K21.9 GASTROESOPHAGEAL REFLUX DISEASE WITHOUT ESOPHAGITIS: ICD-10-CM

## 2025-02-27 RX ORDER — PANTOPRAZOLE SODIUM 20 MG/1
20 TABLET, DELAYED RELEASE ORAL
Qty: 30 TABLET | Refills: 2 | OUTPATIENT
Start: 2025-02-27

## 2025-02-27 NOTE — TELEPHONE ENCOUNTER
Contacted pt to schedule f/u appt. Person on the line (spouse) stated that pt  3 months ago. Will not provide more info. Made Hortencia AVITIA aware of situation to see if can gather more info for us to be able to vanessa on chart. Thanks!

## 2025-03-12 DIAGNOSIS — K21.9 GASTROESOPHAGEAL REFLUX DISEASE WITHOUT ESOPHAGITIS: ICD-10-CM

## 2025-03-14 RX ORDER — PANTOPRAZOLE SODIUM 20 MG/1
20 TABLET, DELAYED RELEASE ORAL
Qty: 30 TABLET | Refills: 2 | OUTPATIENT
Start: 2025-03-14

## 2025-04-10 DIAGNOSIS — L73.2 HIDRADENITIS SUPPURATIVA: ICD-10-CM

## 2025-04-11 DIAGNOSIS — K21.9 GASTROESOPHAGEAL REFLUX DISEASE WITHOUT ESOPHAGITIS: ICD-10-CM

## 2025-04-11 RX ORDER — PANTOPRAZOLE SODIUM 20 MG/1
20 TABLET, DELAYED RELEASE ORAL
Qty: 30 TABLET | Refills: 2 | Status: SHIPPED | OUTPATIENT
Start: 2025-04-11

## 2025-04-11 RX ORDER — CLINDAMYCIN PHOSPHATE AND BENZOYL PEROXIDE 10; 50 MG/G; MG/G
GEL TOPICAL
Qty: 45 G | Refills: 5 | Status: SHIPPED | OUTPATIENT
Start: 2025-04-11